# Patient Record
Sex: MALE | Race: WHITE
[De-identification: names, ages, dates, MRNs, and addresses within clinical notes are randomized per-mention and may not be internally consistent; named-entity substitution may affect disease eponyms.]

---

## 2019-06-20 ENCOUNTER — HOSPITAL ENCOUNTER (EMERGENCY)
Dept: HOSPITAL 72 - EMR | Age: 29
Discharge: HOME | End: 2019-06-20
Payer: SELF-PAY

## 2019-06-20 VITALS — SYSTOLIC BLOOD PRESSURE: 126 MMHG | DIASTOLIC BLOOD PRESSURE: 82 MMHG

## 2019-06-20 VITALS — HEIGHT: 73 IN | WEIGHT: 215 LBS | BODY MASS INDEX: 28.49 KG/M2

## 2019-06-20 VITALS — DIASTOLIC BLOOD PRESSURE: 88 MMHG | SYSTOLIC BLOOD PRESSURE: 126 MMHG

## 2019-06-20 DIAGNOSIS — K11.21: Primary | ICD-10-CM

## 2019-06-20 DIAGNOSIS — L03.211: ICD-10-CM

## 2019-06-20 LAB
ADD MANUAL DIFF: NO
ALBUMIN SERPL-MCNC: 4.4 G/DL (ref 3.4–5)
ALBUMIN/GLOB SERPL: 1 {RATIO} (ref 1–2.7)
ALP SERPL-CCNC: 49 U/L (ref 46–116)
ALT SERPL-CCNC: 42 U/L (ref 12–78)
ANION GAP SERPL CALC-SCNC: 11 MMOL/L (ref 5–15)
AST SERPL-CCNC: 22 U/L (ref 15–37)
BASOPHILS NFR BLD AUTO: 1.9 % (ref 0–2)
BILIRUB SERPL-MCNC: 0.4 MG/DL (ref 0.2–1)
BUN SERPL-MCNC: 13 MG/DL (ref 7–18)
CALCIUM SERPL-MCNC: 9.5 MG/DL (ref 8.5–10.1)
CHLORIDE SERPL-SCNC: 100 MMOL/L (ref 98–107)
CK SERPL-CCNC: 70 U/L (ref 26–308)
CO2 SERPL-SCNC: 28 MMOL/L (ref 21–32)
CREAT SERPL-MCNC: 1.2 MG/DL (ref 0.55–1.3)
EOSINOPHIL NFR BLD AUTO: 0 % (ref 0–3)
ERYTHROCYTE [DISTWIDTH] IN BLOOD BY AUTOMATED COUNT: 12 % (ref 11.6–14.8)
GLOBULIN SER-MCNC: 4.2 G/DL
HCT VFR BLD CALC: 47 % (ref 42–52)
HGB BLD-MCNC: 16 G/DL (ref 14.2–18)
LYMPHOCYTES NFR BLD AUTO: 23.7 % (ref 20–45)
MCV RBC AUTO: 86 FL (ref 80–99)
MONOCYTES NFR BLD AUTO: 15.7 % (ref 1–10)
NEUTROPHILS NFR BLD AUTO: 58.7 % (ref 45–75)
PLATELET # BLD: 271 K/UL (ref 150–450)
POTASSIUM SERPL-SCNC: 3.8 MMOL/L (ref 3.5–5.1)
RBC # BLD AUTO: 5.45 M/UL (ref 4.7–6.1)
SODIUM SERPL-SCNC: 139 MMOL/L (ref 136–145)
WBC # BLD AUTO: 8.5 K/UL (ref 4.8–10.8)

## 2019-06-20 PROCEDURE — 87040 BLOOD CULTURE FOR BACTERIA: CPT

## 2019-06-20 PROCEDURE — 83605 ASSAY OF LACTIC ACID: CPT

## 2019-06-20 PROCEDURE — 93005 ELECTROCARDIOGRAM TRACING: CPT

## 2019-06-20 PROCEDURE — 96361 HYDRATE IV INFUSION ADD-ON: CPT

## 2019-06-20 PROCEDURE — 70491 CT SOFT TISSUE NECK W/DYE: CPT

## 2019-06-20 PROCEDURE — 99284 EMERGENCY DEPT VISIT MOD MDM: CPT

## 2019-06-20 PROCEDURE — 82550 ASSAY OF CK (CPK): CPT

## 2019-06-20 PROCEDURE — 85025 COMPLETE CBC W/AUTO DIFF WBC: CPT

## 2019-06-20 PROCEDURE — 80053 COMPREHEN METABOLIC PANEL: CPT

## 2019-06-20 PROCEDURE — 36415 COLL VENOUS BLD VENIPUNCTURE: CPT

## 2019-06-20 PROCEDURE — 96374 THER/PROPH/DIAG INJ IV PUSH: CPT

## 2019-06-20 PROCEDURE — 71045 X-RAY EXAM CHEST 1 VIEW: CPT

## 2019-06-20 NOTE — DIAGNOSTIC IMAGING REPORT
Indication: Chest pain

 

Technique: One view of the chest

 

Comparison: none

 

Findings: Inspiration is suboptimal. Some atelectasis or scarring is seen at the left

lung base. The heart is borderline enlarged. The lungs and pleural spaces are

otherwise clear.

 

Impression: Hypoventilatory exam

 

Borderline cardiomegaly

 

Left basilar atelectasis or scarring

## 2019-06-20 NOTE — EMERGENCY ROOM REPORT
History of Present Illness


General


Chief Complaint:  General Complaint


Source:  Patient





Present Illness


HPI


28-year-old male presents to the emergency department complaining of subjective 

fevers, chills, body aches, difficulty sleeping and swelling to the left cheek 

x6 days.  Patient was seen by primary care 2 days ago had CT scan which did 

identify an enlarged parotid gland did not have any mentioning of abscess.  

Patient has been on amoxicillin for 2 days however his symptoms have rapidly 

progressed and he states that the swelling in his left cheek has almost tripled 

in size since onset.  Patient states he took Advil prior to arrival which was 

given to him at his primary care provider's office before being sent here to 

the emergency department.  The patient's primary care provider has concern for 

sepsis due to lymphadenopathy.  Patient denies history of immunocompromise he 

denies recent travel or ill contacts.  Patient states he is unknown what his 

vaccination history is as he received childhood vaccinations and [place] and is 

not sure whether or not he was vaccinated for mumps.  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 Denies toothaches 

or recent dental procedures.


Allergies:  


Coded Allergies:  


     No Known Allergies (Unverified , 6/20/19)





Patient History


Past Medical History:  see triage record


Past Surgical History:  none


Pertinent Family History:  none


Reviewed Nursing Documentation:  PMH: Agreed; PSxH: Agreed





Nursing Documentation-PMH


Past Medical History:  No Stated History





Review of Systems


All Other Systems:  negative except mentioned in HPI





Physical Exam





Vital Signs








  Date Time  Temp Pulse Resp B/P (MAP) Pulse Ox O2 Delivery O2 Flow Rate FiO2


 


6/20/19 12:40 98.2 103 18 126/88 (101) 94 Room Air  











Medical Decision Making


PA Attestation


Dr. Dow  is my supervising Physician whom patient management has been 

discussed with.


Diagnostic Impression:  


 Primary Impression:  


 Acute parotitis


 Additional Impression:  


 Cellulitis of cheek


ER Course


28-year-old male presents to the emergency department complaining of subjective 

fevers, chills, body aches, difficulty sleeping and swelling to the left cheek 

x6 days.  Patient was seen by primary care 2 days ago had CT scan which did 

identify an enlarged parotid gland did not have any mentioning of abscess.  

Patient has been on amoxicillin for 2 days however his symptoms have rapidly 

progressed and he states that the swelling in his left cheek has almost tripled 

in size since onset.  Patient states he took Advil prior to arrival which was 

given to him at his primary care provider's office before being sent here to 

the emergency department.  The patient's primary care provider has concern for 

sepsis due to lymphadenopathy.  Patient denies history of immunocompromise he 

denies recent travel or ill contacts.  Patient states he is unknown what his 

vaccination history is as he received childhood vaccinations and [place] and is 

not sure whether or not he was vaccinated for mumps.  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 Denies toothaches 

or recent dental procedures. 





Ddx considered but are not limited to sialoadenitis, sialolithiasis, mumps, 

parotitis, abscess, neoplasm/mass, adenopathy. 





Vital signs: are WNL, pt. is afebrile





H&PE are most consistent with parotitis, due to rapid progression and extension 

down into the neck further imaging is required. 





ORDERS: 


-CBC with differential: WNL no leukocytosis or shift.


-BMP: WNL


-Lactic Acid: WNL


- Blood Cultures: Pending


-CT neck with contrast: " Marked enlargement of the superficial lobe of the 

left parotid gland, consistent with parotitis, nonspecific as regards to 

etiology.  There is some adjacent fat stranding in the soft tissue with edema 

and presumably cellulitis no findings to suggest abscess calculus or ductal 

obstruction. "  Per official radiology report- Please see report for specific 

details.





ED INTERVENTIONS: 


- Toradol IV


- 1 liter NS IV


- Levaquin 750mg PO





This case was d/w his PCP and collaborative decision to start pt. on Levaquin 

rather than clindamycin.





DISCHARGE: At this time pt. is stable for d/c to home. Will provide printed 

patient care instructions, and any necessary prescriptions. Care plan and 

follow up instructions have been discussed with the patient prior to discharge.





Labs








Test


  6/20/19


13:00


 


White Blood Count


  8.5 K/UL


(4.8-10.8)


 


Red Blood Count


  5.45 M/UL


(4.70-6.10)


 


Hemoglobin


  16.0 G/DL


(14.2-18.0)


 


Hematocrit


  47.0 %


(42.0-52.0)


 


Mean Corpuscular Volume 86 FL (80-99) 


 


Mean Corpuscular Hemoglobin


  29.4 PG


(27.0-31.0)


 


Mean Corpuscular Hemoglobin


Concent 34.0 G/DL


(32.0-36.0)


 


Red Cell Distribution Width


  12.0 %


(11.6-14.8)


 


Platelet Count


  271 K/UL


(150-450)


 


Mean Platelet Volume


  6.8 FL


(6.5-10.1)


 


Neutrophils (%) (Auto)


  58.7 %


(45.0-75.0)


 


Lymphocytes (%) (Auto)


  23.7 %


(20.0-45.0)


 


Monocytes (%) (Auto)


  15.7 %


(1.0-10.0)


 


Eosinophils (%) (Auto)


  0.0 %


(0.0-3.0)


 


Basophils (%) (Auto)


  1.9 %


(0.0-2.0)


 


Sodium Level


  139 MMOL/L


(136-145)


 


Potassium Level


  3.8 MMOL/L


(3.5-5.1)


 


Chloride Level


  100 MMOL/L


()


 


Carbon Dioxide Level


  28 MMOL/L


(21-32)


 


Anion Gap


  11 mmol/L


(5-15)


 


Blood Urea Nitrogen


  13 mg/dL


(7-18)


 


Creatinine


  1.2 MG/DL


(0.55-1.30)


 


Estimat Glomerular Filtration


Rate > 60 mL/min


(>60)


 


Glucose Level


  96 MG/DL


()


 


Lactic Acid Level


  1.00 mmol/L


(0.4-2.0)


 


Calcium Level


  9.5 MG/DL


(8.5-10.1)


 


Total Bilirubin


  0.4 MG/DL


(0.2-1.0)


 


Aspartate Amino Transf


(AST/SGOT) 22 U/L (15-37) 


 


 


Alanine Aminotransferase


(ALT/SGPT) 42 U/L (12-78) 


 


 


Alkaline Phosphatase


  49 U/L


()


 


Total Creatine Kinase


  70 U/L


()


 


Total Protein


  8.6 G/DL


(6.4-8.2)


 


Albumin


  4.4 G/DL


(3.4-5.0)


 


Globulin 4.2 g/dL 


 


Albumin/Globulin Ratio 1.0 (1.0-2.7) 








EKG Diagnostic Results


EP Interpretation:  Dr. Dow


Rate:  normal - 94 bpm


Rhythm:  NSR


ST Segments:  no acute changes


ASA given to the pt in ED:  No


PA Scribe Text


This Interpretation was scribed by ANILA Mccord.





CT/MRI/US Diagnostic Results


CT/MRI/US Diagnostic Results :  


   Imaging Test Ordered:  CT Neck with contrast


   Impression


" Marked enlargement of the superficial lobe of the left parotid gland, 

consistent with parotitis, nonspecific as regards to etiology.  There is some 

adjacent fat stranding in the soft tissue with edema and presumably cellulitis 

no findings to suggest abscess calculus or ductal obstruction. "  Per official 

radiology report- Please see report for specific details.





Last Vital Signs








  Date Time  Temp Pulse Resp B/P (MAP) Pulse Ox O2 Delivery O2 Flow Rate FiO2


 


6/20/19 12:40 98.2 103 18 126/88 (101) 94 Room Air  








Disposition:  HOME, SELF-CARE


Condition:  Stable


Scripts


Levofloxacin* (LEVAQUIN*) 500 Mg Tablet


500 MG ORAL DAILY for 7 Days, #7 TAB


   Prov: Lacey Mccord         6/20/19 


Ibuprofen* (MOTRIN*) 600 Mg Tablet


600 MG ORAL THREE TIMES A DAY, #30 TAB 0 Refills


   Prov: Lacey Mccord         6/20/19


Patient Instructions:  Cellulitis, Easy-to-Read, Parotitis





Additional Instructions:  


Take medications as directed. 


 ** Follow up with a Primary Care Provider in 3-5 days, even if your symptoms 

have resolved. ** 





Return sooner to ED if new symptoms occur, or current symptoms become worse. 











- Please note that this Emergency Department Report was dictated using [employer] technology software, occasionally this can lead to 

erroneous entry secondary to interpretation by the dictation equipment.











Lacey Mccord Jun 20, 2019 13:15

## 2019-06-20 NOTE — NUR
ED Nurse Note:



pt walked in due to swelling on the left lower side of the face satrted 3 days 
ago, pt denies trauma, pt stated a pain of 3/10 when touched. pt stated he went 
to an urgent care unit and was adviced to go to the ed when the symptoms 
worses. seen by ashley cabrera, with orders amde and caqrried out. iv g 20 started 
on pt left hand. will continue to monitor

## 2019-06-20 NOTE — DIAGNOSTIC IMAGING REPORT
Indication: Femurs, chills, body aches, left cheek swelling

 

Technique: IV administration nonionic contrast.  Spiral acquisitions obtained through

the neck   Multiplanar  reconstructions were generated. Total dose length product

743.79 mGycm.  CTDIvol(s) 18.96 mGy. Radiation dose was minimized using automated

exposure control

 

Comparison: none

 

Findings: There is marked enlargement of the superficial lobe of the left parotid

gland. This also demonstrates increased attenuation presumably indicative of contrast

enhancement. No definite intraductal calculi or salivary ductal dilatation

demonstrated. A prominent node is seen at the inferior border of the left parotid

gland, measures 16 mm in diameter. There is stranding of the fat in the left

submental region extending into the lateral upper neck. There is also some stranding

of the fat and musculature superficial to the parotid gland.

 

The right parotid gland is unremarkable. The bilateral submandibular glands are

unremarkable. There is slight asymmetric prominence to the left jugulodigastric node,

which measures 2.5 cm in length. Other prominent but not frankly enlarged nodes are

demonstrated on the left. No circumscribed fluid collections to suggest abscess

demonstrated.

 

There is mild prominence of the tonsillar pillars bilaterally, resulting in mild

narrowing of the posterior nasopharynx the adenoids are unremarkable. The oropharynx

and hypopharynx are unremarkable. The larynx is unremarkable. The trachea is

unremarkable. The upper mediastinum is unremarkable. The thyroid is unremarkable..

 

Impression: Marked enlargement of the superficial lobe of the left parotid gland,

consistent with parotitis, nonspecific as regards etiology. There is some adjacent

surrounding soft tissue edema, presumably cellulitis, as well. No findings to suggest

abscess. No evidence of calculus or ductal obstruction.

 

Left cervical regional lymphadenopathy, presumably reactive secondary to the above

 

 

 

 

 

The CT scanner at [facility] is accredited by the American College of

Radiology and the scans are performed using protocols designed to limit radiation

exposure to as low as reasonably achievable to attain images of sufficient resolution

adequate for diagnostic evaluation.

## 2019-06-22 NOTE — CARDIOLOGY REPORT
--------------- APPROVED REPORT --------------





EKG Measurement

Heart [id number]

LA [id number]

[id number]

[id number]

[id number]





Normal sinus rhythm

Nonspecific T wave abnormality

Abnormal ECG

## 2020-03-09 NOTE — NUR
ED Nurse Note:

meds well tolerate.

Pt cleared by health care Provider for discharge.  DC instructions/prescription 
was given and explained to pt and verbalized understanding of teachings. All 
medical deviecs such as ID band  removed. Pt is AAO x4, ambulatory and left 
with all personal belongings. Per PA Team:    From: Tresa Robertson  Sent: 3/6/2020  11:43 AM CDT  To: JUV[facility] Nurse Msg Pool  Subject: OCCIPITAL & TRIGGERPOINT INJECTIONS APPROVAL     The benefits listed below are for internal use only and should not be disclosed to anyone, including patients.    Please advise callers to direct questions about benefits directly to the insurance carrier.    Per [employer] Website/Elena at linkedÃ¼  Phone # [phone]  Effective date of coverage 04/01/06  Current paid thru date (for Exchange plans only) N/A  Type of Stay Outpatient/ Day Surgery  Is Precert/Preauth/Notification required for CPT code(s)  73013, 68743, No. NO PA REQUIRED as long as the patient insurance is still effective  Is Predetermination required for CPT code(s) 66846, 44808, No. NO PRE-D REQUIRED  Reference # for the call [id number]    Thank you  Tresa Jacobs